# Patient Record
Sex: MALE | Race: WHITE | URBAN - METROPOLITAN AREA
[De-identification: names, ages, dates, MRNs, and addresses within clinical notes are randomized per-mention and may not be internally consistent; named-entity substitution may affect disease eponyms.]

---
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
## 2018-05-11 ENCOUNTER — IMPORTED ENCOUNTER (OUTPATIENT)
Dept: URBAN - METROPOLITAN AREA CLINIC 1 | Facility: CLINIC | Age: 18
End: 2018-05-11

## 2018-05-11 PROBLEM — H52.13: Noted: 2018-05-11

## 2018-05-11 PROCEDURE — S0621 ROUTINE OPHTHALMOLOGICAL EXA: HCPCS

## 2018-05-11 NOTE — PATIENT DISCUSSION
1. Myopia: Rx was given for correction if indicated and requested. Return for an appointment in 1 yr 36 with Dr. Mindi Nuñez.

## 2019-05-23 ENCOUNTER — IMPORTED ENCOUNTER (OUTPATIENT)
Dept: URBAN - METROPOLITAN AREA CLINIC 1 | Facility: CLINIC | Age: 19
End: 2019-05-23

## 2019-05-23 PROBLEM — H52.13: Noted: 2019-05-23

## 2019-05-23 PROCEDURE — S0621 ROUTINE OPHTHALMOLOGICAL EXA: HCPCS

## 2019-05-23 NOTE — PATIENT DISCUSSION
1. Myopia -- Rx was given for correction if indicated and requested. Return for an appointment in 1 year for a 36 with Dr. Rubin Garcia.

## 2020-05-21 ENCOUNTER — IMPORTED ENCOUNTER (OUTPATIENT)
Dept: URBAN - METROPOLITAN AREA CLINIC 1 | Facility: CLINIC | Age: 20
End: 2020-05-21

## 2020-05-21 PROBLEM — H52.13: Noted: 2020-05-21

## 2020-05-21 PROCEDURE — S0621 ROUTINE OPHTHALMOLOGICAL EXA: HCPCS

## 2020-05-21 NOTE — PATIENT DISCUSSION
1. Myopia -- Rx was given for correction if indicated and requested. Return for an appointment in 1 year 36 with Dr. Michele Arroyo.

## 2022-04-02 ASSESSMENT — VISUAL ACUITY
OS_SC: 20/20
OS_SC: 20/20
OD_SC: 20/20
OD_SC: 20/20
OS_SC: 20/20-2
OS_CC: J1+
OD_CC: J1
OD_CC: J1+
OD_CC: J1+
OS_SC: 20/20
OD_SC: 20/20
OD_SC: 20/20
OS_CC: J1
OS_CC: J1+

## 2022-04-02 ASSESSMENT — TONOMETRY
OS_IOP_MMHG: 16
OD_IOP_MMHG: 16
OS_IOP_MMHG: 18
OD_IOP_MMHG: 18
OS_IOP_MMHG: 18
OS_IOP_MMHG: 18